# Patient Record
Sex: MALE | Race: WHITE | NOT HISPANIC OR LATINO
[De-identification: names, ages, dates, MRNs, and addresses within clinical notes are randomized per-mention and may not be internally consistent; named-entity substitution may affect disease eponyms.]

---

## 2017-02-13 ENCOUNTER — APPOINTMENT (OUTPATIENT)
Dept: ORTHOPEDIC SURGERY | Facility: CLINIC | Age: 62
End: 2017-02-13

## 2017-02-13 VITALS
HEART RATE: 88 BPM | HEIGHT: 66 IN | BODY MASS INDEX: 27.32 KG/M2 | DIASTOLIC BLOOD PRESSURE: 95 MMHG | WEIGHT: 170 LBS | SYSTOLIC BLOOD PRESSURE: 131 MMHG

## 2017-02-13 DIAGNOSIS — M19.012 PRIMARY OSTEOARTHRITIS, LEFT SHOULDER: ICD-10-CM

## 2017-02-13 DIAGNOSIS — M50.90 CERVICAL DISC DISORDER, UNSPECIFIED, UNSPECIFIED CERVICAL REGION: ICD-10-CM

## 2017-06-21 ENCOUNTER — OTHER (OUTPATIENT)
Age: 62
End: 2017-06-21

## 2017-11-16 ENCOUNTER — RESULT CHARGE (OUTPATIENT)
Age: 62
End: 2017-11-16

## 2017-11-29 ENCOUNTER — OTHER (OUTPATIENT)
Age: 62
End: 2017-11-29

## 2017-12-13 ENCOUNTER — FORM ENCOUNTER (OUTPATIENT)
Age: 62
End: 2017-12-13

## 2017-12-14 ENCOUNTER — APPOINTMENT (OUTPATIENT)
Dept: MRI IMAGING | Facility: HOSPITAL | Age: 62
End: 2017-12-14

## 2017-12-14 ENCOUNTER — OUTPATIENT (OUTPATIENT)
Dept: OUTPATIENT SERVICES | Facility: HOSPITAL | Age: 62
LOS: 1 days | End: 2017-12-14
Payer: COMMERCIAL

## 2017-12-14 DIAGNOSIS — R93.0 ABNORMAL FINDINGS ON DIAGNOSTIC IMAGING OF SKULL AND HEAD, NOT ELSEWHERE CLASSIFIED: ICD-10-CM

## 2017-12-14 DIAGNOSIS — G93.9 DISORDER OF BRAIN, UNSPECIFIED: ICD-10-CM

## 2017-12-14 PROCEDURE — 70546 MR ANGIOGRAPH HEAD W/O&W/DYE: CPT | Mod: 26

## 2017-12-14 PROCEDURE — 70546 MR ANGIOGRAPH HEAD W/O&W/DYE: CPT

## 2019-12-04 ENCOUNTER — APPOINTMENT (OUTPATIENT)
Dept: NEUROLOGY | Facility: CLINIC | Age: 64
End: 2019-12-04
Payer: COMMERCIAL

## 2019-12-04 VITALS
DIASTOLIC BLOOD PRESSURE: 84 MMHG | SYSTOLIC BLOOD PRESSURE: 154 MMHG | HEIGHT: 66 IN | BODY MASS INDEX: 27.32 KG/M2 | WEIGHT: 170 LBS | TEMPERATURE: 97.9 F | OXYGEN SATURATION: 97 % | HEART RATE: 66 BPM

## 2019-12-04 PROCEDURE — 99205 OFFICE O/P NEW HI 60 MIN: CPT

## 2019-12-04 NOTE — REVIEW OF SYSTEMS
[Fever] : no fever [Chills] : no chills [Suicidal] : not suicidal [Feeling Poorly] : not feeling poorly [Feeling Tired] : not feeling tired [Anxiety] : no anxiety [Confused or Disoriented] : no confusion [Depression] : no depression [Memory Lapses or Loss] : no memory loss [Decr. Concentrating Ability] : no decrease in concentrating ability [Difficulty with Language] : no ~M difficulty with language [Repeating Questions] : no repeated questioning about recent events [Changed Thought Patterns] : no change in thought patterns [Facial Weakness] : no facial weakness [Hand Weakness] : no hand weakness [Arm Weakness] : no arm weakness [Leg Weakness] : no leg weakness [Poor Coordination] : good coordination [Difficulty Writing] : no difficulty writing [Difficulties in Speech] : no speech difficulties [Numbness] : no numbness [Tingling] : no tingling [Dizziness] : no dizziness [Fainting] : no fainting [Seizures] : no convulsions [Vertigo] : no vertigo [Tension Headache] : no tension-type headache [Lightheadedness] : no lightheadedness [Difficulty Walking] : no difficulty walking [Inability to Walk] : able to walk [Ataxia] : no ataxia [Frequent Falls] : not falling [Eye Pain] : no eye pain [Red Eyes] : eyes not red [Eyesight Problems] : no eyesight problems [Earache] : no earache

## 2019-12-04 NOTE — HISTORY OF PRESENT ILLNESS
[FreeTextEntry1] : Mr. 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 Patient has been followed conservatively by Dr. Boyce with yearly MRAs, the most recent one in December 2017 which showed the aneurysm was stable. He denies any TIA/Stroke symptoms.

## 2019-12-04 NOTE — DISCUSSION/SUMMARY
[FreeTextEntry1] : Mr. Graham is a 64 year old man with a known incidental 3.2 x 1.4 mm  ACOM aneurysm since 2014 and has been followed conservatively by Dr. Boyce. The most recent MRA was in December 2017 which was stable. Plan for repeat MRA now and another in 2 years. All of their questions and concerns were addressed.

## 2019-12-04 NOTE — PHYSICAL EXAM
[General Appearance - Alert] : alert [General Appearance - Well Nourished] : well nourished [General Appearance - Well Developed] : well developed [Oriented To Time, Place, And Person] : oriented to person, place, and time [Affect] : the affect was normal [Mood] : the mood was normal [Person] : oriented to person [Place] : oriented to place [Time] : oriented to time [Short Term Intact] : short term memory intact [Concentration Intact] : normal concentrating ability [Visual Intact] : visual attention was ~T not ~L decreased [Naming Objects] : no difficulty naming common objects [Repeating Phrases] : no difficulty repeating a phrase [Writing A Sentence] : no difficulty writing a sentence [Fluency] : fluency intact [Comprehension] : comprehension intact [Reading] : reading intact [Past History] : adequate knowledge of personal past history [Cranial Nerves Optic (II)] : visual acuity intact bilaterally,  visual fields full to confrontation, pupils equal round and reactive to light [Cranial Nerves Oculomotor (III)] : extraocular motion intact [Cranial Nerves Facial (VII)] : face symmetrical [Cranial Nerves Trigeminal (V)] : facial sensation intact symmetrically [Cranial Nerves Vestibulocochlear (VIII)] : hearing was intact bilaterally [Cranial Nerves Glossopharyngeal (IX)] : tongue and palate midline [Cranial Nerves Accessory (XI - Cranial And Spinal)] : head turning and shoulder shrug symmetric [Cranial Nerves Hypoglossal (XII)] : there was no tongue deviation with protrusion [Motor Tone] : muscle tone was normal in all four extremities [Motor Strength] : muscle strength was normal in all four extremities [No Muscle Atrophy] : normal bulk in all four extremities [Motor Handedness Right-Handed] : the patient is right hand dominant [Sensation Tactile Decrease] : light touch was intact [Balance] : balance was intact [Extraocular Movements] : extraocular movements were intact [Optic Disc Abnormality] : the optic disc were normal in size and color [Full Visual Field] : full visual field [Hearing Threshold Finger Rub Not [place]] : hearing was normal [Neck Appearance] : the appearance of the neck was normal [] : no respiratory distress [Heart Rate And Rhythm] : heart rate was normal and rhythm regular [Edema] : there was no peripheral edema [Abnormal Walk] : normal gait [Involuntary Movements] : no involuntary movements were seen [Musculoskeletal - Swelling] : no joint swelling seen [Skin Color & Pigmentation] : normal skin color and pigmentation [Skin Turgor] : normal skin turgor [Abdomen Soft] : soft [Paresis Pronator Drift Left-Sided] : no pronator drift on the left [Paresis Pronator Drift Right-Sided] : no pronator drift on the right [Motor Strength Upper Extremities Bilaterally] : strength was normal in both upper extremities [Motor Strength Lower Extremities Bilaterally] : strength was normal in both lower extremities [Dysdiadochokinesia Bilaterally] : not present [Coordination - Dysmetria Impaired Finger-to-Nose Bilateral] : not present

## 2019-12-10 ENCOUNTER — OUTPATIENT (OUTPATIENT)
Dept: OUTPATIENT SERVICES | Facility: HOSPITAL | Age: 64
LOS: 1 days | End: 2019-12-10
Payer: COMMERCIAL

## 2019-12-10 ENCOUNTER — APPOINTMENT (OUTPATIENT)
Dept: MRI IMAGING | Facility: CLINIC | Age: 64
End: 2019-12-10
Payer: COMMERCIAL

## 2019-12-10 DIAGNOSIS — Z00.8 ENCOUNTER FOR OTHER GENERAL EXAMINATION: ICD-10-CM

## 2019-12-10 PROCEDURE — 70544 MR ANGIOGRAPHY HEAD W/O DYE: CPT | Mod: 26

## 2019-12-10 PROCEDURE — 70544 MR ANGIOGRAPHY HEAD W/O DYE: CPT

## 2020-02-14 ENCOUNTER — OUTPATIENT (OUTPATIENT)
Dept: OUTPATIENT SERVICES | Facility: HOSPITAL | Age: 65
LOS: 1 days | End: 2020-02-14
Payer: COMMERCIAL

## 2020-02-14 VITALS
HEIGHT: 66 IN | DIASTOLIC BLOOD PRESSURE: 77 MMHG | OXYGEN SATURATION: 97 % | RESPIRATION RATE: 15 BRPM | TEMPERATURE: 98 F | SYSTOLIC BLOOD PRESSURE: 147 MMHG | WEIGHT: 173.06 LBS | HEART RATE: 77 BPM

## 2020-02-14 DIAGNOSIS — K40.30 UNILATERAL INGUINAL HERNIA, WITH OBSTRUCTION, WITHOUT GANGRENE, NOT SPECIFIED AS RECURRENT: ICD-10-CM

## 2020-02-14 DIAGNOSIS — Z01.818 ENCOUNTER FOR OTHER PREPROCEDURAL EXAMINATION: ICD-10-CM

## 2020-02-14 DIAGNOSIS — K40.90 UNILATERAL INGUINAL HERNIA, WITHOUT OBSTRUCTION OR GANGRENE, NOT SPECIFIED AS RECURRENT: ICD-10-CM

## 2020-02-14 DIAGNOSIS — Z41.9 ENCOUNTER FOR PROCEDURE FOR PURPOSES OTHER THAN REMEDYING HEALTH STATE, UNSPECIFIED: Chronic | ICD-10-CM

## 2020-02-14 LAB
ALBUMIN SERPL ELPH-MCNC: 3.6 G/DL — SIGNIFICANT CHANGE UP (ref 3.3–5)
ALP SERPL-CCNC: 57 U/L — SIGNIFICANT CHANGE UP (ref 40–120)
ALT FLD-CCNC: 24 U/L — SIGNIFICANT CHANGE UP (ref 12–78)
ANION GAP SERPL CALC-SCNC: 7 MMOL/L — SIGNIFICANT CHANGE UP (ref 5–17)
AST SERPL-CCNC: 28 U/L — SIGNIFICANT CHANGE UP (ref 15–37)
BILIRUB SERPL-MCNC: 0.4 MG/DL — SIGNIFICANT CHANGE UP (ref 0.2–1.2)
BUN SERPL-MCNC: 22 MG/DL — SIGNIFICANT CHANGE UP (ref 7–23)
CALCIUM SERPL-MCNC: 8.7 MG/DL — SIGNIFICANT CHANGE UP (ref 8.5–10.1)
CHLORIDE SERPL-SCNC: 107 MMOL/L — SIGNIFICANT CHANGE UP (ref 96–108)
CO2 SERPL-SCNC: 29 MMOL/L — SIGNIFICANT CHANGE UP (ref 22–31)
CREAT SERPL-MCNC: 1.1 MG/DL — SIGNIFICANT CHANGE UP (ref 0.5–1.3)
GLUCOSE SERPL-MCNC: 87 MG/DL — SIGNIFICANT CHANGE UP (ref 70–99)
HCT VFR BLD CALC: 40.9 % — SIGNIFICANT CHANGE UP (ref 39–50)
HGB BLD-MCNC: 13.6 G/DL — SIGNIFICANT CHANGE UP (ref 13–17)
MCHC RBC-ENTMCNC: 29.6 PG — SIGNIFICANT CHANGE UP (ref 27–34)
MCHC RBC-ENTMCNC: 33.3 GM/DL — SIGNIFICANT CHANGE UP (ref 32–36)
MCV RBC AUTO: 89.1 FL — SIGNIFICANT CHANGE UP (ref 80–100)
NRBC # BLD: 0 /100 WBCS — SIGNIFICANT CHANGE UP (ref 0–0)
PLATELET # BLD AUTO: 285 K/UL — SIGNIFICANT CHANGE UP (ref 150–400)
POTASSIUM SERPL-MCNC: 3.5 MMOL/L — SIGNIFICANT CHANGE UP (ref 3.5–5.3)
POTASSIUM SERPL-SCNC: 3.5 MMOL/L — SIGNIFICANT CHANGE UP (ref 3.5–5.3)
PROT SERPL-MCNC: 6.7 G/DL — SIGNIFICANT CHANGE UP (ref 6–8.3)
RBC # BLD: 4.59 M/UL — SIGNIFICANT CHANGE UP (ref 4.2–5.8)
RBC # FLD: 12.9 % — SIGNIFICANT CHANGE UP (ref 10.3–14.5)
SODIUM SERPL-SCNC: 143 MMOL/L — SIGNIFICANT CHANGE UP (ref 135–145)
WBC # BLD: 6.9 K/UL — SIGNIFICANT CHANGE UP (ref 3.8–10.5)
WBC # FLD AUTO: 6.9 K/UL — SIGNIFICANT CHANGE UP (ref 3.8–10.5)

## 2020-02-14 PROCEDURE — 93010 ELECTROCARDIOGRAM REPORT: CPT

## 2020-02-14 PROCEDURE — 80053 COMPREHEN METABOLIC PANEL: CPT

## 2020-02-14 PROCEDURE — 36415 COLL VENOUS BLD VENIPUNCTURE: CPT

## 2020-02-14 PROCEDURE — G0463: CPT

## 2020-02-14 PROCEDURE — 93005 ELECTROCARDIOGRAM TRACING: CPT

## 2020-02-14 PROCEDURE — 85027 COMPLETE CBC AUTOMATED: CPT

## 2020-02-14 NOTE — H&P PST ADULT - HISTORY OF PRESENT ILLNESS
63 yo M for repair right inguinal hernia w mesh  c/o swelling right groin  x 1 month  no pain or nausea

## 2020-02-27 ENCOUNTER — TRANSCRIPTION ENCOUNTER (OUTPATIENT)
Age: 65
End: 2020-02-27

## 2020-02-28 ENCOUNTER — RESULT REVIEW (OUTPATIENT)
Age: 65
End: 2020-02-28

## 2020-02-28 ENCOUNTER — OUTPATIENT (OUTPATIENT)
Dept: OUTPATIENT SERVICES | Facility: HOSPITAL | Age: 65
LOS: 1 days | End: 2020-02-28
Payer: COMMERCIAL

## 2020-02-28 VITALS
SYSTOLIC BLOOD PRESSURE: 132 MMHG | OXYGEN SATURATION: 95 % | HEIGHT: 66 IN | HEART RATE: 69 BPM | DIASTOLIC BLOOD PRESSURE: 91 MMHG | WEIGHT: 173.06 LBS | RESPIRATION RATE: 15 BRPM | TEMPERATURE: 98 F

## 2020-02-28 VITALS
TEMPERATURE: 98 F | OXYGEN SATURATION: 97 % | SYSTOLIC BLOOD PRESSURE: 116 MMHG | DIASTOLIC BLOOD PRESSURE: 72 MMHG | RESPIRATION RATE: 14 BRPM | HEART RATE: 70 BPM

## 2020-02-28 DIAGNOSIS — K40.30 UNILATERAL INGUINAL HERNIA, WITH OBSTRUCTION, WITHOUT GANGRENE, NOT SPECIFIED AS RECURRENT: ICD-10-CM

## 2020-02-28 DIAGNOSIS — Z41.9 ENCOUNTER FOR PROCEDURE FOR PURPOSES OTHER THAN REMEDYING HEALTH STATE, UNSPECIFIED: Chronic | ICD-10-CM

## 2020-02-28 PROCEDURE — C1781: CPT

## 2020-02-28 PROCEDURE — 49507 PRP I/HERN INIT BLOCK >5 YR: CPT | Mod: RT

## 2020-02-28 PROCEDURE — 88304 TISSUE EXAM BY PATHOLOGIST: CPT

## 2020-02-28 PROCEDURE — 88304 TISSUE EXAM BY PATHOLOGIST: CPT | Mod: 26

## 2020-02-28 RX ORDER — OXYCODONE HYDROCHLORIDE 5 MG/1
5 TABLET ORAL ONCE
Refills: 0 | Status: DISCONTINUED | OUTPATIENT
Start: 2020-02-28 | End: 2020-02-28

## 2020-02-28 RX ORDER — ACETAMINOPHEN 500 MG
2 TABLET ORAL
Qty: 0 | Refills: 0 | DISCHARGE

## 2020-02-28 RX ORDER — ONDANSETRON 8 MG/1
4 TABLET, FILM COATED ORAL ONCE
Refills: 0 | Status: DISCONTINUED | OUTPATIENT
Start: 2020-02-28 | End: 2020-02-28

## 2020-02-28 RX ORDER — CEFAZOLIN SODIUM 1 G
1000 VIAL (EA) INJECTION ONCE
Refills: 0 | Status: COMPLETED | OUTPATIENT
Start: 2020-02-28 | End: 2020-02-28

## 2020-02-28 RX ORDER — SODIUM CHLORIDE 9 MG/ML
1000 INJECTION, SOLUTION INTRAVENOUS
Refills: 0 | Status: DISCONTINUED | OUTPATIENT
Start: 2020-02-28 | End: 2020-02-28

## 2020-02-28 RX ORDER — HYDROMORPHONE HYDROCHLORIDE 2 MG/ML
0.5 INJECTION INTRAMUSCULAR; INTRAVENOUS; SUBCUTANEOUS
Refills: 0 | Status: DISCONTINUED | OUTPATIENT
Start: 2020-02-28 | End: 2020-02-28

## 2020-02-28 RX ORDER — SIMVASTATIN 20 MG/1
1 TABLET, FILM COATED ORAL
Qty: 0 | Refills: 0 | DISCHARGE

## 2020-02-28 RX ORDER — MAGNESIUM HYDROXIDE 400 MG/1
30 TABLET, CHEWABLE ORAL
Qty: 0 | Refills: 0 | DISCHARGE

## 2020-02-28 RX ADMIN — SODIUM CHLORIDE 75 MILLILITER(S): 9 INJECTION, SOLUTION INTRAVENOUS at 09:45

## 2020-02-28 NOTE — ASU DISCHARGE PLAN (ADULT/PEDIATRIC) - ASU DC SPECIAL INSTRUCTIONSFT
Keep steri-strips clean, dry and intact x 24 hrs. Apply water proof ice pack 20 mins on, 20 mins off to help decrease pain and swelling. After 24 hrs, you may begin showering as usual but Do NOT remove steri-strips. DO NOT scrub or soak incision site. Pat dry. NO tub baths. NO swimming pools. NO hot tubs. NO exercise, gym, sports, or heavy lifting >5lbs until otherwise advised by Dr. Vaz. Take Norco as prescribed for severe pain. You may take over the counter Tylenol 325mg 2 tablets every 4 hours as needed for mild to moderate pain. DO NOT take Tylenol with Norco. DO NOT exceed over 4000mg of Tylenol daily. Take milk of magnesia for constipation while taking narcotics. Follow up with Dr. Vaz as per office instructions.

## 2020-02-28 NOTE — ASU DISCHARGE PLAN (ADULT/PEDIATRIC) - CALL YOUR DOCTOR IF YOU HAVE ANY OF THE FOLLOWING:
Fever greater than (need to indicate Fahrenheit or Celsius)/Pain not relieved by Medications/Nausea and vomiting that does not stop/Bleeding that does not stop

## 2020-02-28 NOTE — ASU DISCHARGE PLAN (ADULT/PEDIATRIC) - CARE PROVIDER_API CALL
Broderick Vaz ()  Surgery  Broderick Vaz [address] , [place] [address]  Phone: [phone]  Fax: [phone]  Follow Up Time:

## 2020-03-02 LAB — SURGICAL PATHOLOGY STUDY: SIGNIFICANT CHANGE UP

## 2021-03-11 NOTE — H&P PST ADULT - ENMT
Is This A New Presentation, Or A Follow-Up?: Skin Lesions negative No oral lesions; no gross abnormalities

## 2021-03-22 NOTE — ASU PATIENT PROFILE, ADULT - AS SC BRADEN FRICTION
Alert-The patient is alert, awake and responds to voice. The patient is oriented to time, place, and person. The triage nurse is able to obtain subjective information.
(3) no apparent problem

## 2021-08-26 ENCOUNTER — TRANSCRIPTION ENCOUNTER (OUTPATIENT)
Age: 66
End: 2021-08-26

## 2021-11-23 PROBLEM — E78.5 HYPERLIPIDEMIA, UNSPECIFIED: Chronic | Status: ACTIVE | Noted: 2020-02-14

## 2021-11-23 PROBLEM — Z86.79 PERSONAL HISTORY OF OTHER DISEASES OF THE CIRCULATORY SYSTEM: Chronic | Status: ACTIVE | Noted: 2020-02-14

## 2021-12-06 ENCOUNTER — NON-APPOINTMENT (OUTPATIENT)
Age: 66
End: 2021-12-06

## 2021-12-08 ENCOUNTER — APPOINTMENT (OUTPATIENT)
Dept: NEUROLOGY | Facility: CLINIC | Age: 66
End: 2021-12-08
Payer: MEDICARE

## 2021-12-08 VITALS
DIASTOLIC BLOOD PRESSURE: 90 MMHG | TEMPERATURE: 97.8 F | HEART RATE: 75 BPM | OXYGEN SATURATION: 97 % | WEIGHT: 181.9 LBS | HEIGHT: 66 IN | BODY MASS INDEX: 29.23 KG/M2 | SYSTOLIC BLOOD PRESSURE: 145 MMHG

## 2021-12-08 DIAGNOSIS — I67.1 CEREBRAL ANEURYSM, NONRUPTURED: ICD-10-CM

## 2021-12-08 PROCEDURE — 99214 OFFICE O/P EST MOD 30 MIN: CPT

## 2021-12-08 RX ORDER — DIAZEPAM 5 MG/1
5 TABLET ORAL
Qty: 1 | Refills: 0 | Status: ACTIVE | COMMUNITY
Start: 2021-12-08 | End: 1900-01-01

## 2021-12-08 NOTE — REVIEW OF SYSTEMS
[Fever] : no fever [Chills] : no chills [Anxiety] : no anxiety [Depression] : no depression [Confused or Disoriented] : no confusion [Memory Lapses or Loss] : no memory loss [Decr. Concentrating Ability] : no decrease in concentrating ability [Difficulty with Language] : no ~M difficulty with language [Facial Weakness] : no facial weakness [Arm Weakness] : no arm weakness [Hand Weakness] : no hand weakness [Leg Weakness] : no leg weakness [Poor Coordination] : good coordination [Numbness] : no numbness [Tingling] : no tingling [Seizures] : no convulsions [Dizziness] : no dizziness [Fainting] : no fainting [Difficulty Walking] : no difficulty walking [Inability to Walk] : able to walk [Eye Pain] : no eye pain [Eyesight Problems] : no eyesight problems [Earache] : no earache [Loss Of Hearing] : no hearing loss [Chest Pain] : no chest pain [Palpitations] : no palpitations [Cough] : no cough [SOB on Exertion] : no shortness of breath during exertion [Constipation] : no constipation [Diarrhea] : no diarrhea

## 2021-12-08 NOTE — PHYSICAL EXAM
[FreeTextEntry1] : GENERAL PHYSICAL EXAM:\par GEN: no distress, normal affect\par HEENT: NCAT, OP clear\par EYES: sclera white, conjunctiva clear, no nystagmus\par NECK: supple\par CV: normal rhythm\par PULM: no respiratory distress, normal rhythm and effort\par EXT: no edema, no cyanosis\par MSK: muscle tone and strength normal\par SKIN: warm, dry, no rash or lesion on exposed skin \par \par NEUROLOGICAL EXAM:\par Mental Status\par Orientation: alert and oriented to person, place, time, and situation\par Language: clear and fluent, intact comprehension and repetition, intact naming and reading\par \par Cranial Nerves\par II: visual fields full to confrontation \par III, IV, VI: PERRL, EOMI\par V, VII: facial sensation and movement intact and symmetric \par VIII: hearing intact \par IX, X: uvula midline, soft palate elevates normally \par XI: BL shoulder shrug intact \par XII: tongue midline\par \par Motor\par Shoulder abd: 5 (R), 5 (L)\par EF/EE: 5 (R), 5 (L)\par hand : 5 (R), 5 (L)\par HF/HE: 5 (R), 5 (L)\par KF/KE: 5 (R), 5 (L)\par DF/PF: 5 (R), 5 (L) \par Tone and bulk are normal in upper and lower limbs\par No pronator drift\par \par Sensation\par Intact to light touch in all 4 EXTs\par \par Reflex\par 1+ in BL biceps, brachioradialis, patella\par \par Coordination\par Normal FTN bilaterally\par Dysdiadochokinesia not present. \par Able to perform rapid, alternating movements\par \par Gait\par Normal stance, stride, and pivot turn\par Tandem walk intact\par Negative Romberg

## 2021-12-08 NOTE — HISTORY OF PRESENT ILLNESS
[FreeTextEntry1] : Mr. Graham is a 66 year old man with a known incidental 3.2 x 1.4 mm  Acomm aneurysm found in 2014 after he had a headache while working out in the gym. Patient has been followed conservatively by Dr. Boyce with yearly MRAs, the most recent one in 2019 which showed the aneurysm was stable. He denies any TIA/Stroke symptoms.

## 2021-12-08 NOTE — DISCUSSION/SUMMARY
[FreeTextEntry1] : Mr. Graham is a 64 year old man with a known incidental 3.2 x 1.4 mm  Acomm aneurysm since 2014 and has been followed conservatively since. The most recent MRA was in December 2019 was stable. Plan for repeat MRA now and in another two years. All of their questions and concerns were addressed.

## 2021-12-16 ENCOUNTER — APPOINTMENT (OUTPATIENT)
Dept: MRI IMAGING | Facility: CLINIC | Age: 66
End: 2021-12-16
Payer: MEDICARE

## 2021-12-16 ENCOUNTER — OUTPATIENT (OUTPATIENT)
Dept: OUTPATIENT SERVICES | Facility: HOSPITAL | Age: 66
LOS: 1 days | End: 2021-12-16
Payer: MEDICARE

## 2021-12-16 DIAGNOSIS — I67.1 CEREBRAL ANEURYSM, NONRUPTURED: ICD-10-CM

## 2021-12-16 DIAGNOSIS — Z41.9 ENCOUNTER FOR PROCEDURE FOR PURPOSES OTHER THAN REMEDYING HEALTH STATE, UNSPECIFIED: Chronic | ICD-10-CM

## 2021-12-16 PROCEDURE — G1004: CPT

## 2021-12-16 PROCEDURE — 70544 MR ANGIOGRAPHY HEAD W/O DYE: CPT | Mod: 26,ME

## 2021-12-16 PROCEDURE — 70544 MR ANGIOGRAPHY HEAD W/O DYE: CPT | Mod: ME

## 2023-01-25 ENCOUNTER — APPOINTMENT (OUTPATIENT)
Dept: ORTHOPEDIC SURGERY | Facility: CLINIC | Age: 68
End: 2023-01-25
Payer: MEDICARE

## 2023-01-25 VITALS — BODY MASS INDEX: 28.12 KG/M2 | HEIGHT: 66 IN | WEIGHT: 175 LBS

## 2023-01-25 DIAGNOSIS — E78.00 PURE HYPERCHOLESTEROLEMIA, UNSPECIFIED: ICD-10-CM

## 2023-01-25 PROCEDURE — 99204 OFFICE O/P NEW MOD 45 MIN: CPT | Mod: 25

## 2023-01-25 PROCEDURE — 73564 X-RAY EXAM KNEE 4 OR MORE: CPT | Mod: LT

## 2023-01-25 PROCEDURE — 20610 DRAIN/INJ JOINT/BURSA W/O US: CPT | Mod: LT

## 2023-01-25 PROCEDURE — J3490N: CUSTOM | Mod: NC

## 2023-01-25 NOTE — DISCUSSION/SUMMARY
[de-identified] : Progress note completed by JOEY Arshad under the direct supervision of Hayes Smart M.D.

## 2023-01-25 NOTE — PHYSICAL EXAM
[Left] : left knee [NL (0)] : extension 0 degrees [5___] : hamstring 5[unfilled]/5 [Equivocal] : equivocal Kristine [] : no erythema [TWNoteComboBox7] : flexion 120 degrees

## 2023-01-25 NOTE — HISTORY OF PRESENT ILLNESS
[Sudden] : sudden [8] : 8 [1] : 2 [Sharp] : sharp [Shooting] : shooting [Constant] : constant [de-identified] : 1/25/23: 68 y/o male c/o left knee pain that started yesterday. No specific injury, but had been jump roping the day prior. Describes anterior/medial pain with limited ROM. Pain with ambulating. Denies buckling/locking. Has been using ice with some relief. Denies history of prior injury.\par \par Occupation:  [] : no [de-identified] : ice, Advil

## 2023-01-25 NOTE — ASSESSMENT
[FreeTextEntry1] : Underlying pathology reviewed and treatment options discussed. \par 01/25/2023 : xrays left knee, 4 views,  reveal adv medial/tricompartmental OA\par Activity modifier as tolerated.\par offered and patient elected for steroid injection for the left knee.\par Questions addressed.\par \par \par A Large joint injection was performed of the [LEFT KNEE]. The indication for this procedure was pain and inflammation, and patient had decreased mobility in the joint. Risks, benefits and alternatives to a steroid injection procedure were discussed; Risks outlined include but are not limited to infection, sepsis, bleeding, scarring, skin discoloration, temporary increase in pain, syncopal episode, failure to resolve symptoms, allergic reaction, symptom recurrence, and elevation of blood sugar in diabetics.. All questions were answered to the patient's apparent satisfaction and informed consent obtained. Prior to injection a 'Time Out' was conducted in accordance with [place] policy and the site and nature of procedure verified with the patient. \par  \par Procedure: The area of injection was prepared in a sterile fashion. The injection and aspiration was carried out utilizing sterile technique. The site was prepped with alcohol, betadine, ethyl chloride sprayed topically and sterile technique used.\par  \par ( X ) 1cc of Celestone(30mg/ml) \par ( X ) 2cc of 1% Lidocaine \par \par Patient tolerated the procedure well and direct pressure was applied for hemostasis. The patient was reminded of potential post-injection risks including, but not limited to, delayed hypersensitivity reactions and/or infection. Ice tonight to the injection site.\par \par

## 2023-02-08 ENCOUNTER — APPOINTMENT (OUTPATIENT)
Dept: ORTHOPEDIC SURGERY | Facility: CLINIC | Age: 68
End: 2023-02-08
Payer: MEDICARE

## 2023-02-08 VITALS — BODY MASS INDEX: 28.12 KG/M2 | HEIGHT: 66 IN | WEIGHT: 175 LBS

## 2023-02-08 PROCEDURE — 99212 OFFICE O/P EST SF 10 MIN: CPT

## 2023-02-15 ENCOUNTER — APPOINTMENT (OUTPATIENT)
Dept: ORTHOPEDIC SURGERY | Facility: CLINIC | Age: 68
End: 2023-02-15
Payer: MEDICARE

## 2023-02-15 VITALS — BODY MASS INDEX: 28.12 KG/M2 | HEIGHT: 66 IN | WEIGHT: 175 LBS

## 2023-02-15 PROCEDURE — 20610 DRAIN/INJ JOINT/BURSA W/O US: CPT | Mod: LT

## 2023-02-15 PROCEDURE — 99213 OFFICE O/P EST LOW 20 MIN: CPT | Mod: 25

## 2023-02-15 NOTE — DISCUSSION/SUMMARY
[de-identified] : Progress note completed by JOEY Arshad under the direct supervision of Hayes Smart M.D.

## 2023-02-15 NOTE — PHYSICAL EXAM
[Left] : left knee [NL (0)] : extension 0 degrees [5___] : hamstring 5[unfilled]/5 [Equivocal] : equivocal Kristine [] : tenderness [TWNoteComboBox7] : flexion 120 degrees

## 2023-02-15 NOTE — HISTORY OF PRESENT ILLNESS
[Sudden] : sudden [5] : 5 [7] : 7 [Localized] : localized [Sharp] : sharp [Shooting] : shooting [Stabbing] : stabbing [Occasional] : occasional [Household chores] : household chores [Leisure] : leisure [Sleep] : sleep [de-identified] : 02/15/2023Here to start euflexxa for  the left tknee\par \par .1/25/23: 68 y/o male c/o left knee pain that started yesterday. No specific injury, but had been jump roping the day prior. Describes anterior/medial pain with limited ROM. Pain with ambulating. Denies buckling/locking. Has been using ice with some relief. Denies history of prior injury.\par \par Occupation:  [] : no [FreeTextEntry1] : LT knee [FreeTextEntry5] : Pt is here for follow up of left knee pain. Pain is the same since the last visit.  [de-identified] : ice, Advil

## 2023-02-15 NOTE — ASSESSMENT
[FreeTextEntry1] : Underlying pathology reviewed and treatment options discussed. \par 01/25/2023 : xrays left knee, 4 views,  reveal adv medial/tricompartmental OA\par Activity modifier as tolerated.\par offered and patient elected for steroid injection for the left knee.\par Questions addressed.\par \par 02/08/2023: Plans to hold off on gel injections. \par \par 02/15/2023\par 1st euflexxa left knee. \par Apply ice to affected area.\par Return to office in 1 week.\par \par \par A Large joint injection was performed of the left knee. An injection of Euflexxa, series #1 was used.  The indication for this procedure was pain and x-ray evidence of Osteoarthritis on this or prior visit, and patient had decreased mobility in the joint. Risks, benefits and alternatives to procedure were discussed; Risks outlined include but are not limited to infection, sepsis, bleeding, scarring, skin discoloration, temporary increase in pain, syncopal episode, failure to resolve symptoms, allergic reaction, and symptom recurrence. All questions were answered to the patient's apparent satisfaction and informed consent obtained. The area of injection was prepared in a sterile fashion. Prior to injection a 'Time Out' was conducted in accordance with [facility] policy and the site and nature of procedure verified with the patient. \par  \par Procedure: \par The injection and aspiration was carried out utilizing sterile technique. The site was prepped with alcohol, betadine, ethyl chloride sprayed topically and sterile technique used.\par  \par ( X ) 2cc of Euflexxa \par  \par Patient tolerated the procedure well and direct pressure was applied for hemostasis. The patient was reminded of potential post-injection risks including, but not limited to, delayed hypersensitivity reactions and/or infection. Ice tonight to the injection site.\par \par \par \par \par

## 2023-02-15 NOTE — HISTORY OF PRESENT ILLNESS
[Sudden] : sudden [6] : 6 [7] : 7 [Sharp] : sharp [Shooting] : shooting [Stabbing] : stabbing [Constant] : constant [de-identified] : 1/25/23: 68 y/o male c/o left knee pain that started yesterday. No specific injury, but had been jump roping the day prior. Describes anterior/medial pain with limited ROM. Pain with ambulating. Denies buckling/locking. Has been using ice with some relief. Denies history of prior injury.\par \par Occupation:  [] : no [de-identified] : ice, Advil

## 2023-02-15 NOTE — ASSESSMENT
[FreeTextEntry1] : Underlying pathology reviewed and treatment options discussed. \par 01/25/2023 : xrays left knee, 4 views,  reveal adv medial/tricompartmental OA\par Activity modifier as tolerated.\par offered and patient elected for steroid injection for the left knee.\par Questions addressed.\par \par 02/08/2023: Plans to hold off on gel injections.\par \par The documentation recorded by the scribe accurately reflects the service I personally performed and the decisions made by me.\par I, Brendon Quinnibe, attest that this documentation has been prepared under the direction and in the presence of Provider Hayes Smart MD.\par \par The patient was seen by Hayes Smart MD.\par \par

## 2023-02-23 ENCOUNTER — APPOINTMENT (OUTPATIENT)
Dept: ORTHOPEDIC SURGERY | Facility: CLINIC | Age: 68
End: 2023-02-23
Payer: MEDICARE

## 2023-02-23 VITALS — WEIGHT: 175 LBS | HEIGHT: 66 IN | BODY MASS INDEX: 28.12 KG/M2

## 2023-02-23 PROCEDURE — 99024 POSTOP FOLLOW-UP VISIT: CPT

## 2023-02-23 PROCEDURE — 20610 DRAIN/INJ JOINT/BURSA W/O US: CPT | Mod: LT

## 2023-02-23 NOTE — ASSESSMENT
[FreeTextEntry1] : Underlying pathology reviewed and treatment options discussed. \par 01/25/2023 : xrays left knee, 4 views,  reveal adv medial/tricompartmental OA\par Activity modifier as tolerated.\par offered and patient elected for steroid injection for the left knee.\par Questions addressed.\par \par 02/08/2023: Plans to hold off on gel injections. \par \par 02/15/2023\par 1st euflexxa left knee. \par Apply ice to affected area.\par Return to office in 1 week.\par \par 2/23/23: euflexxa #2 left knee, tolerated well. RTO 1 week to cont series.\par \par \par \par \par

## 2023-02-23 NOTE — PROCEDURE
[FreeTextEntry3] : Large joint injection  was performed of the left  knee. \par The indication for this procedure was pain, inflammation and x-ray evidence of Osteoarthritis on this or prior visit. The site was prepped with alcohol, betadine, ethyl chloride sprayed topically and sterile technique used. \par An injection of euflexxa 2ml  series; # 2 was used.  \par Patient was advised to call if redness, pain or fever occur, apply ice for 15 minutes out of every hour for the next 12-24 hours as tolerated and patient was advised to rest the joint(s) for 2 days. Patient has tried OTC's including aspirin, Ibuprofen, Aleve, etc or prescription NSAIDS, and/or exercises at home and/or physical therapy without satisfactory response, patient had decreased mobility in the joint and the risks benefits, and alternatives have been discussed, and verbal consent was obtained. \par

## 2023-02-23 NOTE — HISTORY OF PRESENT ILLNESS
[5] : 5 [7] : 7 [Dull/Aching] : dull/aching [Localized] : localized [Sharp] : sharp [Shooting] : shooting [Occasional] : occasional [Household chores] : household chores [Leisure] : leisure [Sleep] : sleep [2] : 2 [Euflexxa] : Euflexxa [] : Post Surgical Visit: no [FreeTextEntry1] : LT knee [FreeTextEntry5] : Pt is here for euflexxa #2 of the left knee. Started euflexxa series with Dr. Smart on 2/15/23.  [FreeTextEntry6] : tenderness [de-identified] : 2/15/23 [de-identified] : Dr. Smart [de-identified] : euflexxa injections [de-identified] : 2/15/23 [de-identified] : LT knee

## 2023-02-23 NOTE — PHYSICAL EXAM
[Left] : left knee [NL (0)] : extension 0 degrees [5___] : hamstring 5[unfilled]/5 [Equivocal] : equivocal Kristine [] : mildly antalgic [TWNoteComboBox7] : flexion 120 degrees

## 2023-03-01 ENCOUNTER — APPOINTMENT (OUTPATIENT)
Dept: ORTHOPEDIC SURGERY | Facility: CLINIC | Age: 68
End: 2023-03-01
Payer: MEDICARE

## 2023-03-01 PROCEDURE — 20610 DRAIN/INJ JOINT/BURSA W/O US: CPT | Mod: LT

## 2023-03-01 PROCEDURE — 99024 POSTOP FOLLOW-UP VISIT: CPT

## 2023-03-01 NOTE — DISCUSSION/SUMMARY
[de-identified] : Progress note completed by JOEY Arshad under the direct supervision of Hayes Smart M.D.

## 2023-03-01 NOTE — PROCEDURE
[Left] : of the left [Knee] : knee [Alcohol] : alcohol [Betadine] : betadine [Ethyl Chloride sprayed topically] : ethyl chloride sprayed topically [Sterile technique used] : sterile technique used [Euflexxa(20mg)] : 20mg of Euflexxa [#3] : series #3

## 2023-03-01 NOTE — HISTORY OF PRESENT ILLNESS
[Sudden] : sudden [5] : 5 [7] : 7 [Localized] : localized [Sharp] : sharp [Shooting] : shooting [Stabbing] : stabbing [Occasional] : occasional [Household chores] : household chores [Leisure] : leisure [Sleep] : sleep [3] : 3 [Euflexxa] : Euflexxa [de-identified] : 3/1/23: Euflexxa #3 left knee today. He saw Dr. Lovett last week for #2. Symptoms continue. Denies complication with prior injection.\par \par 02/15/2023Here to start euflexxa for  the left tknee\par \par .1/25/23: 66 y/o male c/o left knee pain that started yesterday. No specific injury, but had been jump roping the day prior. Describes anterior/medial pain with limited ROM. Pain with ambulating. Denies buckling/locking. Has been using ice with some relief. Denies history of prior injury.\par \par Occupation:  [] : no [FreeTextEntry1] : LT knee [de-identified] : euflexxa injections [de-identified] : 2/23/23 [de-identified] : Lt knee

## 2023-03-01 NOTE — ASSESSMENT
[FreeTextEntry1] : Underlying pathology reviewed and treatment options discussed. \par 01/25/2023 : xrays left knee, 4 views,  reveal adv medial/tricompartmental OA\par Activity modifier as tolerated.\par offered and patient elected for steroid injection for the left knee.\par Questions addressed.\par \par 02/08/2023: Plans to hold off on gel injections. \par \par 02/15/2023\par 1st euflexxa left knee. \par Apply ice to affected area.\par Return to office in 1 week.\par \par 3/1/23: Euflexxa #3 left knee tolerated well.\par Return in 6 weeks if symptoms persist.\par \par \par A Large joint injection was performed of the left knee. An injection of Euflexxa, series #3 was used.  The indication for this procedure was pain and x-ray evidence of Osteoarthritis on this or prior visit, and patient had decreased mobility in the joint. Risks, benefits and alternatives to procedure were discussed; Risks outlined include but are not limited to infection, sepsis, bleeding, scarring, skin discoloration, temporary increase in pain, syncopal episode, failure to resolve symptoms, allergic reaction, and symptom recurrence. All questions were answered to the patient's apparent satisfaction and informed consent obtained. The area of injection was prepared in a sterile fashion. Prior to injection a 'Time Out' was conducted in accordance with [place] policy and the site and nature of procedure verified with the patient. \par  \par Procedure: \par The injection and aspiration was carried out utilizing sterile technique. The site was prepped with alcohol, betadine, ethyl chloride sprayed topically and sterile technique used.\par  \par ( X ) 2cc of Euflexxa \par  \par Patient tolerated the procedure well and direct pressure was applied for hemostasis. The patient was reminded of potential post-injection risks including, but not limited to, delayed hypersensitivity reactions and/or infection. Ice tonight to the injection site.\par \par \par \par \par

## 2023-03-30 ENCOUNTER — APPOINTMENT (OUTPATIENT)
Dept: ORTHOPEDIC SURGERY | Facility: CLINIC | Age: 68
End: 2023-03-30
Payer: MEDICARE

## 2023-03-30 ENCOUNTER — FORM ENCOUNTER (OUTPATIENT)
Age: 68
End: 2023-03-30

## 2023-03-30 VITALS — HEIGHT: 66 IN | BODY MASS INDEX: 28.12 KG/M2 | WEIGHT: 175 LBS

## 2023-03-30 DIAGNOSIS — S90.31XA CONTUSION OF RIGHT FOOT, INITIAL ENCOUNTER: ICD-10-CM

## 2023-03-30 PROCEDURE — 73630 X-RAY EXAM OF FOOT: CPT | Mod: RT

## 2023-03-30 PROCEDURE — 99214 OFFICE O/P EST MOD 30 MIN: CPT

## 2023-03-30 NOTE — DISCUSSION/SUMMARY
[de-identified] : Progress note completed by JOEY Arshad under the direct supervision of Hayes Smart M.D.

## 2023-03-30 NOTE — HISTORY OF PRESENT ILLNESS
[Sudden] : sudden [5] : 5 [7] : 7 [Localized] : localized [Sharp] : sharp [Shooting] : shooting [Stabbing] : stabbing [Occasional] : occasional [Household chores] : household chores [Leisure] : leisure [Sleep] : sleep [3] : 3 [Euflexxa] : Euflexxa [de-identified] : 3/30/23: Follow up left knee. Denies relief with visco injections (completed 3/1/23).  Also notes pain in the right great toe after dropping a iris on it 3 weeks ago.\par \par 3/1/23: Euflexxa #3 left knee today. He saw Dr. Lovett last week for #2. Symptoms continue. Denies complication with prior injection.\par \par 02/15/2023Here to start euflexxa for  the left tknee\par \par .1/25/23: 66 y/o male c/o left knee pain that started yesterday. No specific injury, but had been jump roping the day prior. Describes anterior/medial pain with limited ROM. Pain with ambulating. Denies buckling/locking. Has been using ice with some relief. Denies history of prior injury.\par \par Occupation:  [] : no [FreeTextEntry1] : LT knee [FreeTextEntry5] : Pain is still the same [de-identified] : euflexxa injections [de-identified] : 2/23/23 [de-identified] : Lt knee

## 2023-03-30 NOTE — ASSESSMENT
[FreeTextEntry1] : Underlying pathology reviewed and treatment options discussed. \par 01/25/2023 : xrays left knee, 4 views,  reveal adv medial/tricompartmental OA\par Activity modifier as tolerated.\par offered and patient elected for steroid injection for the left knee.\par Questions addressed.\par \par 02/08/2023: Plans to hold off on gel injections. \par \par 02/15/2023\par 1st euflexxa left knee. \par Apply ice to affected area.\par Return to office in 1 week.\par \par 3/1/23: Euflexxa #3 left knee tolerated well.\par Return in 6 weeks if symptoms persist.\par \par 3/30/23: Left knee pain continues despite conservative care.\par xray of the right foot reveals possible distal 1st toe proximal phalanx fracture\par He may need to consider surgical options including TKA, but he hesitates as he feels his knee pain went to 0-100% in a short timeline. \par Intermittent nsaids use. \par Discussed possible prescription NSAIDs. \par We discussed obtaining an MRI\par Obtain MRI of the left knee to eval pathology of chondral changes. \par Likely artis of meniscal tear is high with adv medial OA. \par Questions answered.\par He may benefit from a medial off  brace. \par \par

## 2023-03-30 NOTE — PHYSICAL EXAM
[Left] : left knee [NL (0)] : extension 0 degrees [5___] : hamstring 5[unfilled]/5 [Equivocal] : equivocal Kristine [Right] : right foot and ankle [1st] : 1st [TWNoteComboBox7] : flexion 120 degrees [] : no laceration [FreeTextEntry9] : good motion

## 2023-03-31 ENCOUNTER — APPOINTMENT (OUTPATIENT)
Dept: MRI IMAGING | Facility: CLINIC | Age: 68
End: 2023-03-31
Payer: MEDICARE

## 2023-03-31 PROCEDURE — 73721 MRI JNT OF LWR EXTRE W/O DYE: CPT | Mod: LT,MH

## 2023-04-06 ENCOUNTER — APPOINTMENT (OUTPATIENT)
Dept: ORTHOPEDIC SURGERY | Facility: CLINIC | Age: 68
End: 2023-04-06
Payer: MEDICARE

## 2023-04-06 VITALS — WEIGHT: 175 LBS | BODY MASS INDEX: 28.12 KG/M2 | HEIGHT: 66 IN

## 2023-04-06 DIAGNOSIS — S83.232D COMPLEX TEAR OF MEDIAL MENISCUS, CURRENT INJURY, LEFT KNEE, SUBSEQUENT ENCOUNTER: ICD-10-CM

## 2023-04-06 DIAGNOSIS — M17.12 UNILATERAL PRIMARY OSTEOARTHRITIS, LEFT KNEE: ICD-10-CM

## 2023-04-06 PROCEDURE — 99213 OFFICE O/P EST LOW 20 MIN: CPT

## 2023-04-06 NOTE — DATA REVIEWED
[MRI] : MRI [Left] : left [Knee] : knee [Report was reviewed and noted in the chart] : The report was reviewed and noted in the chart [I reviewed the films/CD and agree] : I reviewed the films/CD and agree [FreeTextEntry1] : MRI left knee impression:\par 1. Tricompartmental arthrosis, most severe medially, with complex medial meniscal tearing and subchondral edema in the medial tibial plateau with moderate pes bursitis and anterior medial varices.\par 2. Chronic ligament sprains, lateral meniscal degeneration, extensor mechanism tendinopathy, prepatellar soft tissue swelling, effusion, popliteal cyst and tiny scattered loose bodies without acute fracture.

## 2023-04-06 NOTE — ASSESSMENT
[FreeTextEntry1] : Underlying pathology reviewed and treatment options discussed. \par 01/25/2023 : xrays left knee, 4 views,  reveal adv medial/tricompartmental OA\par Activity modifier as tolerated.\par offered and patient elected for steroid injection for the left knee.\par Questions addressed.\par \par 02/08/2023: Plans to hold off on gel injections. \par \par 02/15/2023\par 1st euflexxa left knee. \par Apply ice to affected area.\par Return to office in 1 week.\par \par 3/1/23: Euflexxa #3 left knee tolerated well.\par Return in 6 weeks if symptoms persist.\par \par 3/30/23: Left knee pain continues despite conservative care.\par xray of the right foot reveals possible distal 1st toe proximal phalanx fracture\par He may need to consider surgical options including TKA, but he hesitates as he feels his knee pain went to 0-100% in a short timeline. \par Intermittent NSAIDs use. \par Discussed possible prescription NSAIDs. \par We discussed obtaining an MRI\par Obtain MRI of the left knee to eval pathology of chondral changes. \par Likely artis of meniscal tear is high with adv medial OA. \par Questions answered.\par He may benefit from a medial off  brace. \par \par 04/06/2023: MRI reviewed and discussed.\par Arthroscopic intervention is not recommended. \par Start PT and HEP to improve mechanics and reduce pain.\par Discussed repeating injections again in the future. \par Questions addressed. Activity modifier as tolerated. Apply ice to affected area.\par RTO PRN. \par \par

## 2023-04-06 NOTE — HISTORY OF PRESENT ILLNESS
[6] : 6 [3] : 3 [Dull/Aching] : dull/aching [Sharp] : sharp [Stabbing] : stabbing [de-identified] : 4/6/23: Follow up left knee. Symptoms continue. He had MRI performed.\par \par 3/30/23: Follow up left knee. Denies relief with visco injections (completed 3/1/23).  Also notes pain in the right great toe after dropping a iris on it 3 weeks ago.\par \par 3/1/23: Euflexxa #3 left knee today. He saw Dr. Lovett last week for #2. Symptoms continue. Denies complication with prior injection.\par \par 02/15/2023Here to start euflexxa for  the left tknee\par \par .1/25/23: 68 y/o male c/o left knee pain that started yesterday. No specific injury, but had been jump roping the day prior. Describes anterior/medial pain with limited ROM. Pain with ambulating. Denies buckling/locking. Has been using ice with some relief. Denies history of prior injury.\par \par Occupation:  [] : Post Surgical Visit: no [FreeTextEntry1] : left knee  [de-identified] : none

## 2023-06-01 ENCOUNTER — APPOINTMENT (OUTPATIENT)
Dept: ORTHOPEDIC SURGERY | Facility: CLINIC | Age: 68
End: 2023-06-01

## 2023-12-04 ENCOUNTER — APPOINTMENT (OUTPATIENT)
Dept: MRI IMAGING | Facility: CLINIC | Age: 68
End: 2023-12-04
Payer: MEDICARE

## 2023-12-04 PROCEDURE — 70544 MR ANGIOGRAPHY HEAD W/O DYE: CPT

## 2023-12-21 ENCOUNTER — NON-APPOINTMENT (OUTPATIENT)
Age: 68
End: 2023-12-21

## 2025-01-06 ENCOUNTER — NON-APPOINTMENT (OUTPATIENT)
Age: 70
End: 2025-01-06